# Patient Record
(demographics unavailable — no encounter records)

---

## 2024-10-31 NOTE — HISTORY OF PRESENT ILLNESS
[FreeTextEntry1] :  LMP: 8/25/2024 RISHABH: 6/1/2025 GA: 9wks 4days  Ms. Delacruz is a 34 year old female who presents today for confirmation of pregnancy. She reports that she feels overall well but endorses fatigue and nausea. She states that she is getting over a bad cold.   OB history: G1: MAB D&C  G2: current  GYN history: denies prior abnormal pap smears, STIs, pelvic infections - Sexually active with 1 male partner with no concerns - irregular periods MedHx: asthma - no inhaler needed SurgHx: wisdom teeth removal Allergies: NKDA Medications: PNV, prozac 20 mg, allegra, probiotic SocialHx: lives with partner Leonides; works in [employer], no pets, no etoh use